# Patient Record
Sex: FEMALE | Race: OTHER | NOT HISPANIC OR LATINO | URBAN - METROPOLITAN AREA
[De-identification: names, ages, dates, MRNs, and addresses within clinical notes are randomized per-mention and may not be internally consistent; named-entity substitution may affect disease eponyms.]

---

## 2020-09-04 ENCOUNTER — INPATIENT (INPATIENT)
Facility: HOSPITAL | Age: 70
LOS: 0 days | Discharge: HOME | DRG: 149 | End: 2020-09-05
Attending: STUDENT IN AN ORGANIZED HEALTH CARE EDUCATION/TRAINING PROGRAM | Admitting: STUDENT IN AN ORGANIZED HEALTH CARE EDUCATION/TRAINING PROGRAM
Payer: MEDICARE

## 2020-09-04 VITALS
SYSTOLIC BLOOD PRESSURE: 169 MMHG | RESPIRATION RATE: 17 BRPM | HEIGHT: 60 IN | HEART RATE: 67 BPM | TEMPERATURE: 98 F | DIASTOLIC BLOOD PRESSURE: 95 MMHG | WEIGHT: 121.92 LBS | OXYGEN SATURATION: 98 %

## 2020-09-04 DIAGNOSIS — R11.2 NAUSEA WITH VOMITING, UNSPECIFIED: ICD-10-CM

## 2020-09-04 DIAGNOSIS — R42 DIZZINESS AND GIDDINESS: ICD-10-CM

## 2020-09-04 DIAGNOSIS — Z29.9 ENCOUNTER FOR PROPHYLACTIC MEASURES, UNSPECIFIED: ICD-10-CM

## 2020-09-04 DIAGNOSIS — E03.9 HYPOTHYROIDISM, UNSPECIFIED: ICD-10-CM

## 2020-09-04 LAB
APPEARANCE UR: CLEAR — SIGNIFICANT CHANGE UP
BASOPHILS # BLD AUTO: 0.04 K/UL — SIGNIFICANT CHANGE UP (ref 0–0.2)
BASOPHILS NFR BLD AUTO: 0.6 % — SIGNIFICANT CHANGE UP (ref 0–2)
BILIRUB UR-MCNC: NEGATIVE — SIGNIFICANT CHANGE UP
COLOR SPEC: COLORLESS — SIGNIFICANT CHANGE UP
DIFF PNL FLD: NEGATIVE — SIGNIFICANT CHANGE UP
EOSINOPHIL # BLD AUTO: 0.1 K/UL — SIGNIFICANT CHANGE UP (ref 0–0.5)
EOSINOPHIL NFR BLD AUTO: 1.5 % — SIGNIFICANT CHANGE UP (ref 0–6)
GLUCOSE UR QL: NEGATIVE — SIGNIFICANT CHANGE UP
HCT VFR BLD CALC: 38.6 % — SIGNIFICANT CHANGE UP (ref 34.5–45)
HGB BLD-MCNC: 12.3 G/DL — SIGNIFICANT CHANGE UP (ref 11.5–15.5)
IMM GRANULOCYTES NFR BLD AUTO: 0.3 % — SIGNIFICANT CHANGE UP (ref 0–1.5)
KETONES UR-MCNC: NEGATIVE — SIGNIFICANT CHANGE UP
LEUKOCYTE ESTERASE UR-ACNC: NEGATIVE — SIGNIFICANT CHANGE UP
LYMPHOCYTES # BLD AUTO: 1.49 K/UL — SIGNIFICANT CHANGE UP (ref 1–3.3)
LYMPHOCYTES # BLD AUTO: 22.2 % — SIGNIFICANT CHANGE UP (ref 13–44)
MAGNESIUM SERPL-MCNC: 2 MG/DL — SIGNIFICANT CHANGE UP (ref 1.6–2.6)
MCHC RBC-ENTMCNC: 26.2 PG — LOW (ref 27–34)
MCHC RBC-ENTMCNC: 31.9 GM/DL — LOW (ref 32–36)
MCV RBC AUTO: 82.3 FL — SIGNIFICANT CHANGE UP (ref 80–100)
MONOCYTES # BLD AUTO: 0.32 K/UL — SIGNIFICANT CHANGE UP (ref 0–0.9)
MONOCYTES NFR BLD AUTO: 4.8 % — SIGNIFICANT CHANGE UP (ref 2–14)
NEUTROPHILS # BLD AUTO: 4.74 K/UL — SIGNIFICANT CHANGE UP (ref 1.8–7.4)
NEUTROPHILS NFR BLD AUTO: 70.6 % — SIGNIFICANT CHANGE UP (ref 43–77)
NITRITE UR-MCNC: NEGATIVE — SIGNIFICANT CHANGE UP
NRBC # BLD: 0 /100 WBCS — SIGNIFICANT CHANGE UP (ref 0–0)
PH UR: 7 — SIGNIFICANT CHANGE UP (ref 5–8)
PHOSPHATE SERPL-MCNC: 3 MG/DL — SIGNIFICANT CHANGE UP (ref 2.5–4.5)
PLATELET # BLD AUTO: 328 K/UL — SIGNIFICANT CHANGE UP (ref 150–400)
PROT UR-MCNC: NEGATIVE — SIGNIFICANT CHANGE UP
RBC # BLD: 4.69 M/UL — SIGNIFICANT CHANGE UP (ref 3.8–5.2)
RBC # FLD: 13.7 % — SIGNIFICANT CHANGE UP (ref 10.3–14.5)
SARS-COV-2 RNA SPEC QL NAA+PROBE: SIGNIFICANT CHANGE UP
SP GR SPEC: 1.01 — LOW (ref 1.01–1.02)
TROPONIN T, HIGH SENSITIVITY RESULT: 7 NG/L — SIGNIFICANT CHANGE UP (ref 0–51)
TSH SERPL-MCNC: 0.84 UIU/ML — SIGNIFICANT CHANGE UP (ref 0.27–4.2)
UROBILINOGEN FLD QL: NEGATIVE — SIGNIFICANT CHANGE UP
WBC # BLD: 6.71 K/UL — SIGNIFICANT CHANGE UP (ref 3.8–10.5)
WBC # FLD AUTO: 6.71 K/UL — SIGNIFICANT CHANGE UP (ref 3.8–10.5)

## 2020-09-04 PROCEDURE — 99285 EMERGENCY DEPT VISIT HI MDM: CPT

## 2020-09-04 PROCEDURE — 70498 CT ANGIOGRAPHY NECK: CPT | Mod: 26

## 2020-09-04 PROCEDURE — 71045 X-RAY EXAM CHEST 1 VIEW: CPT | Mod: 26

## 2020-09-04 PROCEDURE — 93010 ELECTROCARDIOGRAM REPORT: CPT

## 2020-09-04 PROCEDURE — 99223 1ST HOSP IP/OBS HIGH 75: CPT

## 2020-09-04 PROCEDURE — 70496 CT ANGIOGRAPHY HEAD: CPT | Mod: 26

## 2020-09-04 RX ORDER — ONDANSETRON 8 MG/1
4 TABLET, FILM COATED ORAL ONCE
Refills: 0 | Status: COMPLETED | OUTPATIENT
Start: 2020-09-04 | End: 2020-09-04

## 2020-09-04 RX ORDER — INFLUENZA VIRUS VACCINE 15; 15; 15; 15 UG/.5ML; UG/.5ML; UG/.5ML; UG/.5ML
0.5 SUSPENSION INTRAMUSCULAR ONCE
Refills: 0 | Status: COMPLETED | OUTPATIENT
Start: 2020-09-04 | End: 2020-09-05

## 2020-09-04 RX ORDER — MECLIZINE HCL 12.5 MG
25 TABLET ORAL EVERY 8 HOURS
Refills: 0 | Status: DISCONTINUED | OUTPATIENT
Start: 2020-09-04 | End: 2020-09-05

## 2020-09-04 RX ORDER — ENOXAPARIN SODIUM 100 MG/ML
40 INJECTION SUBCUTANEOUS DAILY
Refills: 0 | Status: DISCONTINUED | OUTPATIENT
Start: 2020-09-04 | End: 2020-09-05

## 2020-09-04 RX ORDER — SODIUM CHLORIDE 9 MG/ML
500 INJECTION INTRAMUSCULAR; INTRAVENOUS; SUBCUTANEOUS ONCE
Refills: 0 | Status: COMPLETED | OUTPATIENT
Start: 2020-09-04 | End: 2020-09-04

## 2020-09-04 RX ORDER — ACETAMINOPHEN 500 MG
325 TABLET ORAL ONCE
Refills: 0 | Status: COMPLETED | OUTPATIENT
Start: 2020-09-04 | End: 2020-09-04

## 2020-09-04 RX ORDER — ONDANSETRON 8 MG/1
4 TABLET, FILM COATED ORAL EVERY 12 HOURS
Refills: 0 | Status: DISCONTINUED | OUTPATIENT
Start: 2020-09-04 | End: 2020-09-05

## 2020-09-04 RX ORDER — LEVOTHYROXINE SODIUM 125 MCG
125 TABLET ORAL DAILY
Refills: 0 | Status: DISCONTINUED | OUTPATIENT
Start: 2020-09-04 | End: 2020-09-05

## 2020-09-04 RX ORDER — MECLIZINE HCL 12.5 MG
25 TABLET ORAL ONCE
Refills: 0 | Status: COMPLETED | OUTPATIENT
Start: 2020-09-04 | End: 2020-09-04

## 2020-09-04 RX ORDER — ACETAMINOPHEN 500 MG
650 TABLET ORAL ONCE
Refills: 0 | Status: COMPLETED | OUTPATIENT
Start: 2020-09-04 | End: 2020-09-04

## 2020-09-04 RX ORDER — LEVOTHYROXINE SODIUM 125 MCG
1 TABLET ORAL
Qty: 0 | Refills: 0 | DISCHARGE

## 2020-09-04 RX ADMIN — Medication 25 MILLIGRAM(S): at 11:55

## 2020-09-04 RX ADMIN — SODIUM CHLORIDE 500 MILLILITER(S): 9 INJECTION INTRAMUSCULAR; INTRAVENOUS; SUBCUTANEOUS at 12:41

## 2020-09-04 RX ADMIN — SODIUM CHLORIDE 500 MILLILITER(S): 9 INJECTION INTRAMUSCULAR; INTRAVENOUS; SUBCUTANEOUS at 11:40

## 2020-09-04 RX ADMIN — Medication 325 MILLIGRAM(S): at 13:45

## 2020-09-04 RX ADMIN — SODIUM CHLORIDE 500 MILLILITER(S): 9 INJECTION INTRAMUSCULAR; INTRAVENOUS; SUBCUTANEOUS at 13:10

## 2020-09-04 RX ADMIN — Medication 325 MILLIGRAM(S): at 14:38

## 2020-09-04 RX ADMIN — ONDANSETRON 4 MILLIGRAM(S): 8 TABLET, FILM COATED ORAL at 11:40

## 2020-09-04 RX ADMIN — Medication 650 MILLIGRAM(S): at 11:55

## 2020-09-04 RX ADMIN — SODIUM CHLORIDE 500 MILLILITER(S): 9 INJECTION INTRAMUSCULAR; INTRAVENOUS; SUBCUTANEOUS at 12:40

## 2020-09-04 RX ADMIN — Medication 650 MILLIGRAM(S): at 13:30

## 2020-09-04 NOTE — ED ADULT NURSE NOTE - OBJECTIVE STATEMENT
70 year old female, a+ox, presents to ED complaining of dizziness x "a couple of days." pt states that the room is spinning around and not lightheaded. pt takes notice of the spinning when she moves quickly.  +vomited several times prior to coming in to the ED.  Small sips of gatorade was done, but pt was unable to tolerate PO intake.  States that the nausea and spinning occurred at the same time.  No chest pains/sob/palpitations/ 70 year old female, a+ox, presents to ED complaining of dizziness x "a couple of days." pt states that the room is spinning around and not lightheaded. pt takes notice of the spinning when she moves quickly.  +vomited several times prior to coming in to the ED.  Small sips of gatorade was done, but pt was unable to tolerate PO intake.  States that the nausea and spinning occurred at the same time.  No chest pains/sob/palpitations/swelling in feet/fevers/chills/cough. Abd soft nontender, nondistended. lungs cta unlabored. No visual changes, PERRL 3mm bilaterally. no numbness/tingling to extremities.  skin w/d/i

## 2020-09-04 NOTE — ED ADULT NURSE REASSESSMENT NOTE - NS ED NURSE REASSESS COMMENT FT1
Patient's daughter Tesfaye would like to be called at [phone] in case of emergency or if info is needed.

## 2020-09-04 NOTE — ED PROVIDER NOTE - ATTENDING CONTRIBUTION TO CARE
I have personally performed a face to face medical and diagnostic evaluation of the patient. I have discussed with and reviewed the Resident's note and agree with the History, ROS, Physical Exam and MDM unless otherwise indicated. A brief summary of my personal evaluation and impression can be found below.    70F hx hypothyroidism presents with a cc of lightheadedness and dizziness a/w mild b/l frontal HA since this a.m reports shortly after lifting grandson, felt dizzy, no syncope, thereafter had x4 episodes of NBNB nausea and vomiting, no abdominal pain, no fevers, dizziness feels as if room spinning, improved while at rest, worse when sitting and standing, no exertional sx, no chest pain no SOB no HARDIN. NO LE swelling. Denies numbness, tingling or loss of sensation. Denies loss of motor function. NO falls no trauma no LOC. Had similar episode x1 week ago, which resolved. Denies f/c/cp/sob. Denies headache, syncope, Denies chest palpitations, abdominal pain. Denies edema. Denies dysuria, hematuria, BRBPR, tarry stools, diarrhea, constipation.     All other ROS negative, except as above and as per HPI and ROS section.    VITALS: Initial triage and subsequent vitals have been reviewed by me.  GEN APPEARANCE: WDWN, alert, non-toxic, NAD  HEAD: Atraumatic.  EYES: PERRLa, EOMI, vision grossly intact.   NECK: Supple  CV: RRR, S1S2, no c/r/m/g. Cap refill <2 seconds. No bruits.   LUNGS: CTAB. No abnormal breath sounds.  ABDOMEN: Soft, NTND. No guarding or rebound.   MSK/EXT: No spinal or extremity point tenderness. No CVA ttp. Pelvis stable. No peripheral edema.  NEURO: Alert, follows commands. Weight bearing normal. Speech normal. Sensation and motor normal x4 extremities. CN2-12 normal, coordination normal, ambulating normally.  SKIN: Warm, dry and intact. No rash.  PSYCH: Appropriate    Plan/MDM: 70F hypothyroid presents with a cc of dizziness a/w nausea / vomiting after lifting son today, exam non focal, no murmur, ddx consider peripheral vertigo vs dehydration vs e-lyte abnormality, less likely central vertigo given normal exam, sx worse with movement, low c/f cardiogenic origin given no murmur, ekg non ischemic. Will check labs, ua cxr eval for infxn, give meds, hydrate, orthostatics, reassess, consider imaging further workup if not improved.

## 2020-09-04 NOTE — H&P ADULT - PROBLEM SELECTOR PLAN 1
-acute onset but self limited episode of dizziness  -VSS here, orthostatic negative. Labs unrevealing.   -no infectious sx: afeb, no leukocytosis, CXR clear  -CTH reviewed, no acute changes. 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 d/w patient, to get serial imaging as outpatient  -Neuro recs appreciated; no low suspicion for acute cva or central cause of vertigo, possible peripheral vertigo  -good response to meclizine, c/w prn meclizine for now  -fall prec  -TSH wnl  -check TTE  -HST delta neg. EKG nonischemic.  consider nonemergent cards eval in am.

## 2020-09-04 NOTE — H&P ADULT - NSHPREVIEWOFSYSTEMS_GEN_ALL_CORE
REVIEW OF SYSTEMS:  CONSTITUTIONAL: No weakness. No fevers. No chills. No weight loss. Good appetite.  EYES: No visual changes. No eye pain.  ENT: No hearing difficulty. No vertigo. No dysphagia. No Sinusitis/rhinorrhea.  NECK: No pain. No stiffness/rigidity.  CARDIAC: No chest pain. No palpitations.  RESPIRATORY: No cough. No SOB. No hemoptysis.  GASTROINTESTINAL: No abdominal pain. No nausea. No vomiting. No hematemesis. No diarrhea. No constipation. No melena. No hematochezia.  GENITOURINARY: No dysuria. No frequency. No hesitancy. No hematuria.  NEUROLOGICAL: No numbness. No focal weakness. No incontinence. No headache.  BACK: No back pain.  EXTREMITIES: No lower extremity edema. Full ROM.  SKIN: No rashes. No itching. No other lesions.  PSYCHIATRIC: No depression. No anxiety. No SI/HI.  ALLERGIC: No lip swelling. No hives.  All other review of systems is negative unless indicated above.  [  ] Unable to assess ROS because REVIEW OF SYSTEMS:  CONSTITUTIONAL: No weakness. No fevers. No chills. No weight loss. Good appetite.  EYES: No visual changes. No eye pain.  ENT: No hearing difficulty. resolved vertigo. No dysphagia. No Sinusitis/rhinorrhea.  NECK: No pain. No stiffness/rigidity.  CARDIAC: No chest pain. No palpitations.  RESPIRATORY: No cough. No SOB. No hemoptysis.  GASTROINTESTINAL: No abdominal pain. No nausea. No vomiting. No hematemesis. No diarrhea. No constipation. No melena. No hematochezia.  GENITOURINARY: No dysuria. No frequency. No hesitancy. No hematuria.  NEUROLOGICAL: No numbness. No focal weakness. No incontinence. No headache.  BACK: No back pain.  EXTREMITIES: No lower extremity edema. Full ROM.  SKIN: No rashes. No itching. No other lesions.  PSYCHIATRIC: No depression. No anxiety. No SI/HI.  ALLERGIC: No lip swelling. No hives.  All other review of systems is negative unless indicated above.

## 2020-09-04 NOTE — ED ADULT NURSE NOTE - ED STAT RN HANDOFF DETAILS
Report given to LAURA Peralta after shift change after LAURA Rice attempted to call to give report on multiple occasions.

## 2020-09-04 NOTE — H&P ADULT - HISTORY OF PRESENT ILLNESS
70 F PMH hypothyroidism, p/w dizziness and vomiting.  Onset 1 day ago, was feeling fine the night before, states she ate well. She showered this am and daughter brought her grandkids over for her to watch, pt soon after developed room spinning dizziness. Son gave her gatorade, after which pt endorsed feeling better. Pt also tried to eat something salty, after which she vomited, nbnb.  Pt also does endorse that if she gets up from lying down quickly she feels dizzy.  She denies cp, sob, santa, f/c, diarrhea, abd pain, back pain, visual changes, HA, neck pain or stiffness, incontinence, tearing abd or back pain. Denies new meds.     VS: 98.3, 67, 169/95, 17, 98% RA.  Orthostatic negative in ER. Labs: cbc unrevealing, HST delta 10 -->7, TSH wnl, cmp unrevealing, mg/phos wnl, UA neg.  CXR prelim clear lungs. CTH no acute findings. CTA h/n: no occclusions or stenosis, +incidental R MCA bifurcation 3 mm aneurysm. IN ER pt received ivf, tylenol, zofran, and meclizine; pt endorses feeling better after meclizine.

## 2020-09-04 NOTE — H&P ADULT - NSHPSOCIALHISTORY_GEN_ALL_CORE
Social History:    Marital Status:  (   )    (   ) Single    (   )    (  )   Occupation:   Lives with: (  ) alone  (  ) children   (  ) spouse   (  ) parents  (  ) other    Substance Use (street drugs): (  ) never used  (  ) other:  Tobacco Usage:  (   ) never smoked   (   ) former smoker   (   ) current smoker  (     ) pack year  (        ) last cigarette date  Alcohol Usage:  Sexual History: Social History:      Occupation: not working  Lives with: (x  ) alone  (  ) children   (  ) spouse   (  ) parents  (  ) other    Substance Use (street drugs): ( x ) never used  (  ) other:  Tobacco Usage:  (   x) never smoked   (   ) former smoker   (   ) current smoker  (     ) pack year  (        ) last cigarette date  Alcohol Usage: denies

## 2020-09-04 NOTE — PATIENT PROFILE ADULT - NSPROEXTENSIONSOFSELF_GEN_A_NUR
none Principal Discharge DX:	Acute dyspnea  Secondary Diagnosis:	Abnormal findings on cardiac catheterization  Secondary Diagnosis:	DM2 (diabetes mellitus, type 2)

## 2020-09-04 NOTE — ED PROVIDER NOTE - NS ED ROS FT
General: denies fever, chills  HENT: denies nasal congestion, rhinorrhea  Eyes: denies visual changes, blurred vision  CV: denies chest pain, palpitations  Resp: denies difficulty breathing, cough  Abdominal: positive nausea & vomiting, no diarrhea or abdominal pain  MSK: denies muscle aches, leg swelling  Neuro: positive headaches, no numbness or tingling  Skin: denies rashes, bruises

## 2020-09-04 NOTE — H&P ADULT - ATTENDING COMMENTS
Care to be assumed by [facility] hospitalist at 8 am.  This patient was assigned to me by the hospitalist in charge; my involvement in this case has consisted of the initial history, physical, chart review, and management plan.  This patient was previously unknown to me.

## 2020-09-04 NOTE — ED PROVIDER NOTE - OBJECTIVE STATEMENT
Patient is a 71 yo F w/ no significant PMH BIEMS for vomiting. Patient states she had 3-4 episodes of NBNB emesis that was brought on after the patient stood up and felt the room spinning. Patient notes a similar episode last week that was associated with 1 episode of emesis and self resolved. Patient notes a headache at this time, but denies any changes in vision, hearing loss, tinnitus, trauma/falls, LOC, chest pain, SOB, abdominal pain, pain w/ urination or changes in her BM.

## 2020-09-04 NOTE — CONSULT NOTE ADULT - CONSULT REQUESTED DATE/TIME
LVM to notify pt Mirena is covered with $0.00 copay on pharmacy benefit. Need to offer pharmacist consultation and obtain consent to deliver to [facility]. Will follow up if no return call is received.     04-Sep-2020 16:11

## 2020-09-04 NOTE — ED PROVIDER NOTE - PROGRESS NOTE DETAILS
MD Demetris: Patient reassessed at bedside and notes persistent headache that is worse when standing up. Will provide additional tylenol prior to CT visit. MD Demetris: Discussed blood results and CT scan results w/ patient and patient's daughter. Discussed the 3mm aneurysm found on the RCA, unclear of chronicity of it. Also discussed the abnormal EKG that cannot be explained by electrolyte abnormality as patient's blood work does not indicate any electrolyte abnormality. Performed a walking test with patient and patient continues to complain of dizziness and mild headache, although improved from this morning and a right sided gait is noted. Patient will likely require medicine admission, neurology consult and cardiology consult. Patient and patient's daughter understands and agrees with plan. MD Demetris: Patient reassessed at bedside after neurology team evaluated patient. Per neurology, low suspicion for central pathology or stroke. Patient continues to feel symptomatic when she walks; plan will be to admit for cardiac workup secondary to abnormal EKG.

## 2020-09-04 NOTE — ED PROVIDER NOTE - PHYSICAL EXAMINATION
GENERAL: well appearing in no acute distress, non-toxic appearing  HEAD: normocephalic, atraumatic  HENT: oral mucosa moist, neck supple, negative Renetta-hallpike   EYE: Normal conjunctiva, PERRLA, EOMI; negative nystagmus  CARDIAC: regular rate and rhythm, normal S1S2, no appreciable murmurs, 2+ pulses in UE/LE b/l  PULM: normal breath sounds, clear to ascultation bilaterally, no rales, rhonchi, wheezing  GI: abdomen nondistended, soft, nontender, no guarding, rebound tenderness  : no CVA tenderness b/l, no suprapubic tenderness  NEURO: no focal motor or sensory deficits, CN2-12 intact, normal speech  MSK: no peripheral edema, no calf tenderness b/l  SKIN: well-perfused, extremities warm, no visible rashes

## 2020-09-04 NOTE — ED ADULT NURSE REASSESSMENT NOTE - NS ED NURSE REASSESS COMMENT FT1
waiting on medicine bed. rtm clicked. no acute distress. will cont to monitor. vss. daughter at bedside

## 2020-09-04 NOTE — CONSULT NOTE ADULT - SUBJECTIVE AND OBJECTIVE BOX
HPI:  Patient is a 71yo F with pmhx of hypothyroidism who presented to ED for complaint of dizziness and vomiting. Patient reports gradual onset of dizziness at 0630 today. She describes it as a room-spinning sensation with associated nausea. She reports that the dizziness initially subsided while she had her morning tea. She then took a shower and again felt the onset of dizziness. She reports needing to lay down and then having an episode of vomiting. Patient was initially taken to urgent care but then daughter chose to bring her to the hospital. Patient states she did not eat anything else this AM and missed her AM dose of synthroid due to the nausea and vomiting. Patient states that she had a similar episode of dizziness 1 week ago with associated nausea and vomiting. That episode lasted 2-3hrs. Patient at that time hydrated with gatorade and felt better. At the time of evaluation patient reports resolution of all symptoms. Denies headache, dizziness, nausea, blurry/double vision, tinnitus, cp, sob, n/t. Patient endorses mild generalized weakness.     ROS: A 10-system ROS was performed and is negative except for those items noted above and/or in the HPI.    PAST MEDICAL & SURGICAL HISTORY:  Hypothyroid  No significant past surgical history    FAMILY HISTORY:      SOCIAL HISTORY: SOCIAL HISTORY:     Marital Status: (  )   (  ) Single  (  )   (  )      Occupation:      Lives: (  ) alone  (  ) with children   (  ) with spouse  (  ) with parents  (  ) other     Illicit Drug Use: (  ) never used  (  ) other _____     Tobacco Use:  (  ) never smoked  (  ) former smoker  (  ) current smoker  (  ) pack year  (  ) last cigarette date     Alcohol Use:      Sexual History:        MEDICATIONS  Home Medications:  levothyroxine 125 mcg (0.125 mg) oral tablet: 1 tab(s) orally once a day (04 Sep 2020 14:21)      MEDICATIONS  (STANDING):    MEDICATIONS  (PRN):      ALLERGIES/INTOLERANCES:  Allergies  No Known Allergies    Intolerances      OBJECTIVE:  VITALS   Vital Signs Last 24 Hrs  T(C): 36.8 (04 Sep 2020 10:55), Max: 36.8 (04 Sep 2020 10:55)  T(F): 98.3 (04 Sep 2020 10:55), Max: 98.3 (04 Sep 2020 10:55)  HR: 57 (04 Sep 2020 13:22) (57 - 67)  BP: 163/80 (04 Sep 2020 13:22) (163/80 - 169/95)  BP(mean): --  RR: 16 (04 Sep 2020 13:28) (16 - 17)  SpO2: 100% (04 Sep 2020 13:28) (98% - 100%)    PHYSICAL EXAM:    General:  Constitutional: Obese Female, appears stated age, in no apparent distress including pain  Head: Normocephalic & atraumatic.    Neurological (>12):  MS: Awake, alert, oriented to person, place, situation, time. Normal affect. Follows all commands.    Language: Speech is clear, fluent with good repetition & comprehension (able to name objects: pen, watch, thumb)    CNs: PERRLA (R = 3mm, L = 3mm). VFF. EOMI no nystagmus, no diplopia. V1-3 intact to LT/pinprick, well developed masseter muscles b/l. No facial asymmetry b/l, full eye closure strength b/l. Hearing grossly normal (rubbing fingers) b/l. Symmetric palate elevation in midline. Gag reflex deferred. Head turning & shoulder shrug intact b/l. Tongue midline, normal movements, no atrophy.    Fundoscopic: pale w/ sharp discs margins No vascular changes.      Motor: Normal muscle bulk & tone. No noticeable tremor or seizure. No pronator drift.              Deltoid	Biceps	Triceps	   R	5	5	5	5		  L	5	5	5	5	    	H-Flex	K-Flex	K-Ext	D-Flex	P-Flex  R	5	5	5	5	5		   L	5	5	5	5	5		     Sensation: Intact to LT b/l throughout.     Cortical: Extinction on DSS (neglect): none    Reflexes:              Biceps(C5)       BR(C6)     Triceps(C7)               Patellar(L4)    Achilles(S1)    Plantar Resp  R	2	          2	             2		        2		    2		Down   L	2	          2	             2		        2		    2		Down     Coordination: intact rapid-alt movements. No dysmetria to FTN    Gait: Normal Romberg. No postural instability. Normal stance and tandem gait.     LABORATORY:  CBC                       12.3   6.71  )-----------( 328      ( 04 Sep 2020 11:54 )             38.6     Chem 09-04    139  |  103  |  11  ----------------------------<  116<H>  3.7   |  26  |  0.63    Ca    9.0      04 Sep 2020 11:54  Phos  3.0     09-04  Mg     2.0     09-04    TPro  7.1  /  Alb  4.3  /  TBili  0.3  /  DBili  x   /  AST  22  /  ALT  20  /  AlkPhos  88  09-04    LFTs LIVER FUNCTIONS - ( 04 Sep 2020 11:54 )  Alb: 4.3 g/dL / Pro: 7.1 g/dL / ALK PHOS: 88 U/L / ALT: 20 U/L / AST: 22 U/L / GGT: x           Coagulopathy   Lipid Panel   A1c   Cardiac enzymes     U/A Urinalysis Basic - ( 04 Sep 2020 13:07 )    Color: Colorless / Appearance: Clear / S.007 / pH: x  Gluc: x / Ketone: Negative  / Bili: Negative / Urobili: Negative   Blood: x / Protein: Negative / Nitrite: Negative   Leuk Esterase: Negative / RBC: x / WBC x   Sq Epi: x / Non Sq Epi: x / Bacteria: x

## 2020-09-04 NOTE — H&P ADULT - NSHPLABSRESULTS_GEN_ALL_CORE
Labs personally reviewed : cbc unrevealing, HST delta 10 -->7, TSH wnl, cmp unrevealing, mg/phos wnl, UA neg.   Imaging personally reviewed  CXR prelim clear lungs. CTH no acute findings. CTA h/n: no occclusions or stenosis, +incidental R MCA bifurcation 3 mm aneurysm.  EKG personally reviewed

## 2020-09-04 NOTE — CONSULT NOTE ADULT - ASSESSMENT
Patient is a 71yo F with pmhx of hypothyroidism who presented to ED for complaint of dizziness and vomiting. Episode was self-limited and patient reports resolution of all symptoms on evaluation. Neurological exam is normal and non-focal. CTH was negative for any acute pathology. CTA H/N showed and incidental 3mm aneurysm of R MCA bifurcation. ED staff performed kyra-hallpike prior to evaluation. Dizziness possibly peripheral vertigo improved with kyra hallpike maneuver vs 2/2 dehydration. Labs unremarkable     Recommendations  - Meclizine short term for dizziness, reglan for nausea   - Referral for vestibular therapy for continued episodes of peripheral vertigo  - No further neuroimaging recommended at this time   - Neurology follow up outpatient: [address] [place] [address] [phone]    Case discussed with neurology attending Dr. Bedoya Patient is a 69yo F with pmhx of hypothyroidism who presented to ED for complaint of dizziness and vomiting. Episode was self-limited and patient reports resolution of all symptoms on evaluation. Neurological exam is normal and non-focal. CTH was negative for any acute pathology. CTA H/N showed and incidental 3mm aneurysm of R MCA bifurcation. ED staff performed kyra-hallpike prior to evaluation. Dizziness possibly peripheral vertigo improved with kyra hallpike maneuver vs 2/2 dehydration. Low suspicion for central pathology or stroke. Labs unremarkable     Recommendations  - Meclizine short term for dizziness, reglan for nausea   - Referral for vestibular therapy for continued episodes of peripheral vertigo  - No further neuroimaging recommended at this time   - Neurology follow up outpatient: [address] [place] [address] [phone]    Case discussed with neurology attending Dr. Bedoya

## 2020-09-04 NOTE — ED ADULT NURSE NOTE - NS ED NURSE REPORT GIVEN TO FT
attempted to give report at 19:15 . [facility] unit rep stated that the RNs are in the middle of report and cant take report at this time. explained to her that pt is leaving department and the change of shift rn will be unable to give report because he never had contact with pt. Juan RN in ED made aware and acknowledged

## 2020-09-04 NOTE — H&P ADULT - NSHPPHYSICALEXAM_GEN_ALL_CORE
PHYSICAL EXAM:  Vital Signs Last 24 Hrs  T(C): 36.8 (04 Sep 2020 20:55), Max: 37.1 (04 Sep 2020 20:05)  T(F): 98.2 (04 Sep 2020 20:55), Max: 98.7 (04 Sep 2020 20:05)  HR: 53 (04 Sep 2020 20:55) (53 - 71)  BP: 170/79 (04 Sep 2020 20:55) (162/72 - 170/79)  BP(mean): --  RR: 18 (04 Sep 2020 20:55) (16 - 18)  SpO2: 96% (04 Sep 2020 20:55) (96% - 100%)  GENERAL: NAD, well-groomed, well-developed  HEAD:  Atraumatic, Normocephalic  EYES: EOMI, PERRLA, conjunctiva and sclera clear  ENMT: No tonsillar erythema, exudates, or enlargement; Moist mucous membranes, Good dentition, No lesions  NECK: Supple, No JVD, Normal thyroid  CHEST/LUNG: Clear to percussion bilaterally; No rales, rhonchi, wheezing, or rubs  HEART: Regular rate and rhythm; No murmurs, rubs, or gallops  ABDOMEN: Soft, Nontender, Nondistended; Bowel sounds present  EXTREMITIES:  2+ Peripheral Pulses, No clubbing, cyanosis, or edema  LYMPH: No lymphadenopathy noted  SKIN: No rashes or lesions  NERVOUS SYSTEM:  Alert & Oriented X3, Good concentration; Motor Strength 5/5 B/L upper and lower extremities; DTRs 2+ intact and symmetric PHYSICAL EXAM:  Vital Signs Last 24 Hrs  T(C): 36.8 (04 Sep 2020 20:55), Max: 37.1 (04 Sep 2020 20:05)  T(F): 98.2 (04 Sep 2020 20:55), Max: 98.7 (04 Sep 2020 20:05)  HR: 53 (04 Sep 2020 20:55) (53 - 71)  BP: 170/79 (04 Sep 2020 20:55) (162/72 - 170/79)  BP(mean): --  RR: 18 (04 Sep 2020 20:55) (16 - 18)  SpO2: 96% (04 Sep 2020 20:55) (96% - 100%)  GENERAL: NAD, well-groomed, well-developed  HEAD:  Atraumatic, Normocephalic  EYES: EOMI, PERRLA, conjunctiva and sclera clear  ENMT: No tonsillar erythema, exudates, or enlargement; Moist mucous membranes, Good dentition, No lesions  NECK: Supple, No JVD, Normal thyroid  CHEST/LUNG: Clear to percussion bilaterally; No rales, rhonchi, wheezing, or rubs no resp distress or acc muscle usage.  HEART: Regular rate and rhythm; No murmurs, rubs, or gallops, no sig Le edema  ABDOMEN: Soft, Nontender, Nondistended; Bowel sounds present, no rebound/guarding  EXTREMITIES:  2+ Peripheral Pulses, No clubbing, cyanosis, or edema  LYMPH: No lymphadenopathy noted, no lymphangitis  SKIN: No rashes or lesions, no petechiae  NERVOUS SYSTEM:  Alert & Oriented X3, Good concentration; Motor Strength 5/5 B/L upper and lower extremities. no facial droop tongue midline. no dysarthria  PSYCH: normal affect no si no hi

## 2020-09-05 ENCOUNTER — TRANSCRIPTION ENCOUNTER (OUTPATIENT)
Age: 70
End: 2020-09-05

## 2020-09-05 VITALS
HEART RATE: 61 BPM | TEMPERATURE: 98 F | SYSTOLIC BLOOD PRESSURE: 145 MMHG | RESPIRATION RATE: 18 BRPM | DIASTOLIC BLOOD PRESSURE: 75 MMHG | OXYGEN SATURATION: 99 %

## 2020-09-05 LAB
ALBUMIN SERPL ELPH-MCNC: 4.2 G/DL — SIGNIFICANT CHANGE UP (ref 3.3–5)
ALP SERPL-CCNC: 82 U/L — SIGNIFICANT CHANGE UP (ref 40–120)
ALT FLD-CCNC: 18 U/L — SIGNIFICANT CHANGE UP (ref 10–45)
ANION GAP SERPL CALC-SCNC: 10 MMOL/L — SIGNIFICANT CHANGE UP (ref 5–17)
AST SERPL-CCNC: 21 U/L — SIGNIFICANT CHANGE UP (ref 10–40)
BASOPHILS # BLD AUTO: 0.04 K/UL — SIGNIFICANT CHANGE UP (ref 0–0.2)
BASOPHILS NFR BLD AUTO: 0.6 % — SIGNIFICANT CHANGE UP (ref 0–2)
BILIRUB SERPL-MCNC: 0.4 MG/DL — SIGNIFICANT CHANGE UP (ref 0.2–1.2)
BUN SERPL-MCNC: 12 MG/DL — SIGNIFICANT CHANGE UP (ref 7–23)
CALCIUM SERPL-MCNC: 9.2 MG/DL — SIGNIFICANT CHANGE UP (ref 8.4–10.5)
CHLORIDE SERPL-SCNC: 106 MMOL/L — SIGNIFICANT CHANGE UP (ref 96–108)
CO2 SERPL-SCNC: 25 MMOL/L — SIGNIFICANT CHANGE UP (ref 22–31)
CREAT SERPL-MCNC: 0.74 MG/DL — SIGNIFICANT CHANGE UP (ref 0.5–1.3)
CULTURE RESULTS: SIGNIFICANT CHANGE UP
EOSINOPHIL # BLD AUTO: 0.26 K/UL — SIGNIFICANT CHANGE UP (ref 0–0.5)
EOSINOPHIL NFR BLD AUTO: 3.9 % — SIGNIFICANT CHANGE UP (ref 0–6)
GLUCOSE SERPL-MCNC: 98 MG/DL — SIGNIFICANT CHANGE UP (ref 70–99)
HCT VFR BLD CALC: 40.6 % — SIGNIFICANT CHANGE UP (ref 34.5–45)
HCV AB S/CO SERPL IA: 0.23 S/CO — SIGNIFICANT CHANGE UP (ref 0–0.99)
HCV AB SERPL-IMP: SIGNIFICANT CHANGE UP
HGB BLD-MCNC: 12.8 G/DL — SIGNIFICANT CHANGE UP (ref 11.5–15.5)
IMM GRANULOCYTES NFR BLD AUTO: 0.3 % — SIGNIFICANT CHANGE UP (ref 0–1.5)
LYMPHOCYTES # BLD AUTO: 2.42 K/UL — SIGNIFICANT CHANGE UP (ref 1–3.3)
LYMPHOCYTES # BLD AUTO: 36.3 % — SIGNIFICANT CHANGE UP (ref 13–44)
MAGNESIUM SERPL-MCNC: 2.2 MG/DL — SIGNIFICANT CHANGE UP (ref 1.6–2.6)
MCHC RBC-ENTMCNC: 25.8 PG — LOW (ref 27–34)
MCHC RBC-ENTMCNC: 31.5 GM/DL — LOW (ref 32–36)
MCV RBC AUTO: 81.9 FL — SIGNIFICANT CHANGE UP (ref 80–100)
MONOCYTES # BLD AUTO: 0.5 K/UL — SIGNIFICANT CHANGE UP (ref 0–0.9)
MONOCYTES NFR BLD AUTO: 7.5 % — SIGNIFICANT CHANGE UP (ref 2–14)
NEUTROPHILS # BLD AUTO: 3.43 K/UL — SIGNIFICANT CHANGE UP (ref 1.8–7.4)
NEUTROPHILS NFR BLD AUTO: 51.4 % — SIGNIFICANT CHANGE UP (ref 43–77)
NRBC # BLD: 0 /100 WBCS — SIGNIFICANT CHANGE UP (ref 0–0)
PHOSPHATE SERPL-MCNC: 3.2 MG/DL — SIGNIFICANT CHANGE UP (ref 2.5–4.5)
PLATELET # BLD AUTO: 332 K/UL — SIGNIFICANT CHANGE UP (ref 150–400)
POTASSIUM SERPL-MCNC: 3.4 MMOL/L — LOW (ref 3.5–5.3)
POTASSIUM SERPL-SCNC: 3.4 MMOL/L — LOW (ref 3.5–5.3)
PROT SERPL-MCNC: 7 G/DL — SIGNIFICANT CHANGE UP (ref 6–8.3)
RBC # BLD: 4.96 M/UL — SIGNIFICANT CHANGE UP (ref 3.8–5.2)
RBC # FLD: 13.7 % — SIGNIFICANT CHANGE UP (ref 10.3–14.5)
SODIUM SERPL-SCNC: 141 MMOL/L — SIGNIFICANT CHANGE UP (ref 135–145)
SPECIMEN SOURCE: SIGNIFICANT CHANGE UP
WBC # BLD: 6.67 K/UL — SIGNIFICANT CHANGE UP (ref 3.8–10.5)
WBC # FLD AUTO: 6.67 K/UL — SIGNIFICANT CHANGE UP (ref 3.8–10.5)

## 2020-09-05 PROCEDURE — U0003: CPT

## 2020-09-05 PROCEDURE — 99221 1ST HOSP IP/OBS SF/LOW 40: CPT | Mod: GC

## 2020-09-05 PROCEDURE — 90686 IIV4 VACC NO PRSV 0.5 ML IM: CPT

## 2020-09-05 PROCEDURE — 97162 PT EVAL MOD COMPLEX 30 MIN: CPT

## 2020-09-05 PROCEDURE — 70450 CT HEAD/BRAIN W/O DYE: CPT

## 2020-09-05 PROCEDURE — 83735 ASSAY OF MAGNESIUM: CPT

## 2020-09-05 PROCEDURE — 84443 ASSAY THYROID STIM HORMONE: CPT

## 2020-09-05 PROCEDURE — 70496 CT ANGIOGRAPHY HEAD: CPT

## 2020-09-05 PROCEDURE — 80053 COMPREHEN METABOLIC PANEL: CPT

## 2020-09-05 PROCEDURE — 84100 ASSAY OF PHOSPHORUS: CPT

## 2020-09-05 PROCEDURE — 70498 CT ANGIOGRAPHY NECK: CPT

## 2020-09-05 PROCEDURE — 85027 COMPLETE CBC AUTOMATED: CPT

## 2020-09-05 PROCEDURE — 86803 HEPATITIS C AB TEST: CPT

## 2020-09-05 PROCEDURE — 81003 URINALYSIS AUTO W/O SCOPE: CPT

## 2020-09-05 PROCEDURE — 84484 ASSAY OF TROPONIN QUANT: CPT

## 2020-09-05 PROCEDURE — 71045 X-RAY EXAM CHEST 1 VIEW: CPT

## 2020-09-05 PROCEDURE — 87086 URINE CULTURE/COLONY COUNT: CPT

## 2020-09-05 RX ORDER — AMLODIPINE BESYLATE 2.5 MG/1
1 TABLET ORAL
Qty: 30 | Refills: 0
Start: 2020-09-05 | End: 2020-10-04

## 2020-09-05 RX ORDER — MECLIZINE HCL 12.5 MG
1 TABLET ORAL
Qty: 42 | Refills: 0
Start: 2020-09-05 | End: 2020-09-18

## 2020-09-05 RX ORDER — AMLODIPINE BESYLATE 2.5 MG/1
5 TABLET ORAL DAILY
Refills: 0 | Status: DISCONTINUED | OUTPATIENT
Start: 2020-09-05 | End: 2020-09-05

## 2020-09-05 RX ORDER — POTASSIUM CHLORIDE 20 MEQ
40 PACKET (EA) ORAL ONCE
Refills: 0 | Status: COMPLETED | OUTPATIENT
Start: 2020-09-05 | End: 2020-09-05

## 2020-09-05 RX ADMIN — AMLODIPINE BESYLATE 5 MILLIGRAM(S): 2.5 TABLET ORAL at 11:56

## 2020-09-05 RX ADMIN — Medication 40 MILLIEQUIVALENT(S): at 10:21

## 2020-09-05 RX ADMIN — INFLUENZA VIRUS VACCINE 0.5 MILLILITER(S): 15; 15; 15; 15 SUSPENSION INTRAMUSCULAR at 13:38

## 2020-09-05 RX ADMIN — ENOXAPARIN SODIUM 40 MILLIGRAM(S): 100 INJECTION SUBCUTANEOUS at 11:56

## 2020-09-05 RX ADMIN — Medication 125 MICROGRAM(S): at 06:23

## 2020-09-05 NOTE — DISCHARGE NOTE PROVIDER - NSDCCPCAREPLAN_GEN_ALL_CORE_FT
PRINCIPAL DISCHARGE DIAGNOSIS  Diagnosis: Dizziness  Assessment and Plan of Treatment: Orthostatic negative. Labs unrevealing, no infectious signs   CXR clear, CTH showed no acute changes. 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  s/p neurology evaluation. low suspicion for acute cva or central cause of vertigo, possible peripheral vertigo, good response to meclizine,   Continue with meclizine as needed  You can follow up with Neurology outpatient: [address] [place] [address] [phone]  Please follow up with your primray care physician in one week        SECONDARY DISCHARGE DIAGNOSES  Diagnosis: Hypertension  Assessment and Plan of Treatment: Noted Elevated BP  Please continue Norvasc as directed  Low salt diet recommended  Please follow up with your primary care physician    Diagnosis: Hypothyroidism, unspecified type  Assessment and Plan of Treatment: Hypothyroidism, unspecified type    Diagnosis: Brain aneurysm  Assessment and Plan of Treatment:   CTA of head and neck  without large stenosis or occlusion, +incidentally found R MCA bifurcation aneurysm found.   Outpatient serial imaging recommended PRINCIPAL DISCHARGE DIAGNOSIS  Diagnosis: Dizziness  Assessment and Plan of Treatment: Orthostatic negative. Labs unrevealing, no infectious signs  CXR clear, CTH showed no acute changes. 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  s/p neurology evaluation. low suspicion for acute cva or central cause of vertigo, possible peripheral vertigo, good response to meclizine,   Continue with meclizine as needed  Echocardiogram can be done as outpatient   May have  vestibular therapy for continued episodes of peripheral vertigo  You can follow up with Neurology outpatient: [address] [place] [address] [phone]  Please follow up with your primary care physician in one week        SECONDARY DISCHARGE DIAGNOSES  Diagnosis: Hypertension  Assessment and Plan of Treatment: Noted Elevated BP  Please continue Norvasc as directed  Low salt diet recommended  Please follow up with your primary care physician    Diagnosis: Hypothyroidism, unspecified type  Assessment and Plan of Treatment: Hypothyroidism, unspecified type    Diagnosis: Brain aneurysm  Assessment and Plan of Treatment:   CTA of head and neck  without large stenosis or occlusion, +incidentally found R MCA bifurcation aneurysm found.   Outpatient serial imaging recommended

## 2020-09-05 NOTE — DISCHARGE NOTE NURSING/CASE MANAGEMENT/SOCIAL WORK - INFLUENZA IMMUNIZATION DATE (APPROXIMATE):
Calls for to reschedule missed appointment. ( consult appointment)  Patient called stating that he is inpatient today - 1-8-18 - unknown release date. Patient will call back to re-schedule  When he is released and can arrange transportation.  
I will let Dr. Andrade know that patient cancelled due to being inpatient.   
05-Sep-2020

## 2020-09-05 NOTE — PHYSICAL THERAPY INITIAL EVALUATION ADULT - PERTINENT HX OF CURRENT PROBLEM, REHAB EVAL
70 F PMH hypothyroidism, p/w dizziness and vomiting.  Onset 1 day ago. In ED, VS: 98.3, 67, 169/95, 17, 98% RA.  Orthostatic negative in ER. Labs: cbc unrevealing. UA neg.  CXR prelim clear lungs. CTH no acute findings. CTA h/n: no occclusions or stenosis, +incidental R MCA bifurcation 3 mm aneurysm. IN ER pt received ivf, tylenol, zofran, and meclizine; pt endorses feeling better after meclizine. Neuro c/s ordered. ED staff performed kyra-hallpike prior to evaluation.

## 2020-09-05 NOTE — PHYSICAL THERAPY INITIAL EVALUATION ADULT - ADDITIONAL COMMENTS
Per Neuro c/s: Dizziness possibly peripheral vertigo improved with kyra hallpike maneuver vs 2/2 dehydration. Low suspicion for central pathology or stroke. Labs unremarkable Per Neuro c/s: Dizziness possibly peripheral vertigo improved with kyra hallpike maneuver vs 2/2 dehydration. Low suspicion for central pathology or stroke. Labs unremarkable.  Pt lives with spouse, adult children and young grandchildren  in a private house with no steps to enter and 1 flight of stairs to bedroom, + handrail.

## 2020-09-05 NOTE — PROGRESS NOTE ADULT - ATTENDING COMMENTS
plan of care dw patient and dtr over the phone plan of care dw patient and dtr over the phone  dc planning

## 2020-09-05 NOTE — CONSULT NOTE ADULT - SUBJECTIVE AND OBJECTIVE BOX
CHIEF COMPLAINT:    HPI:  70 F PMH hypothyroidism, p/w dizziness and vomiting.  Onset 1 day ago, was feeling fine the night before, states she ate well. She showered this am and daughter brought her grandkids over for her to watch, pt soon after developed room spinning dizziness. Son gave her gatorade, after which pt endorsed feeling better. Pt also tried to eat something salty, after which she vomited, nbnb.  Pt also does endorse that if she gets up from lying down quickly she feels dizzy.  She denies cp, sob, santa, f/c, diarrhea, abd pain, back pain, visual changes, HA, neck pain or stiffness, incontinence, tearing abd or back pain. Denies new meds.     VS: 98.3, 67, 169/95, 17, 98% RA.  Orthostatic negative in ER. Labs: cbc unrevealing, HST delta 10 -->7, TSH wnl, cmp unrevealing, mg/phos wnl, UA neg.  CXR prelim clear lungs. CTH no acute findings. CTA h/n: no occclusions or stenosis, +incidental R MCA bifurcation 3 mm aneurysm. IN ER pt received ivf, tylenol, zofran, and meclizine; pt endorses feeling better after meclizine.       PAST MEDICAL & SURGICAL HISTORY:  Hypothyroid  No significant past surgical history          PREVIOUS DIAGNOSTIC TESTING:    [ ] Echocardiogram:  [ ]  Catheterization:  [ ] Stress Test:  	    MEDICATIONS:  MEDICATIONS  (STANDING):  amLODIPine   Tablet 5 milliGRAM(s) Oral daily  enoxaparin Injectable 40 milliGRAM(s) SubCutaneous daily  influenza   Vaccine 0.5 milliLiter(s) IntraMuscular once  levothyroxine 125 MICROGram(s) Oral daily      FAMILY HISTORY:  No pertinent family history in first degree relatives      SOCIAL HISTORY:    [ ] Non-smoker  [ ] Smoker  [ ] Alcohol    Allergies    No Known Allergies    Intolerances    	    REVIEW OF SYSTEMS:  CONSTITUTIONAL: No fever, weight loss, or fatigue  EYES: No eye pain, visual disturbances, or discharge  ENMT:  No difficulty hearing, tinnitus, vertigo; No sinus or throat pain  NECK: No pain or stiffness  RESPIRATORY: No cough, wheezing, chills or hemoptysis; No Shortness of Breath  CARDIOVASCULAR: No chest pain, palpitations, passing out, dizziness, or leg swelling  GASTROINTESTINAL: No abdominal or epigastric pain. No nausea, vomiting, or hematemesis; No diarrhea or constipation. No melena or hematochezia.  GENITOURINARY: No dysuria, frequency, hematuria, or incontinence  NEUROLOGICAL: No headaches, memory loss, loss of strength, numbness, or tremors  SKIN: No itching, burning, rashes, or lesions   	    [ ] All others negative	  [ ] Unable to obtain    PHYSICAL EXAM:  T(C): 36.8 (09-05-20 @ 08:58), Max: 37.1 (09-04-20 @ 20:05)  HR: 66 (09-05-20 @ 08:58) (53 - 72)  BP: 170/83 (09-05-20 @ 08:58) (162/72 - 170/83)  RR: 18 (09-05-20 @ 08:58) (16 - 18)  SpO2: 100% (09-05-20 @ 08:58) (96% - 100%)  Wt(kg): --  I&O's Summary    04 Sep 2020 07:01  -  05 Sep 2020 07:00  --------------------------------------------------------  IN: 240 mL / OUT: 2 mL / NET: 238 mL    05 Sep 2020 07:01  -  05 Sep 2020 10:24  --------------------------------------------------------  IN: 240 mL / OUT: 0 mL / NET: 240 mL        Appearance: Normal	  Psychiatry: A & O x 3, Mood & affect appropriate  HEENT:   Normal oral mucosa, PERRL, EOMI	  Lymphatic: No lymphadenopathy  Cardiovascular: Normal S1 S2,RRR, No JVD, No murmurs  Respiratory: Lungs clear to auscultation	  Gastrointestinal:  Soft, Non-tender, + BS	  Skin: No rashes, No ecchymoses, No cyanosis	  Neurologic: Non-focal  Extremities: Normal range of motion, No clubbing, cyanosis or edema  Vascular: Peripheral pulses palpable 2+ bilaterally    TELEMETRY: 	    ECG:  	  RADIOLOGY:  OTHER: 	  	  LABS:	 	    CARDIAC MARKERS:                                  12.8   6.67  )-----------( 332      ( 05 Sep 2020 06:25 )             40.6     09-05    141  |  106  |  12  ----------------------------<  98  3.4<L>   |  25  |  0.74    Ca    9.2      05 Sep 2020 06:25  Phos  3.2     09-05  Mg     2.2     09-05    TPro  7.0  /  Alb  4.2  /  TBili  0.4  /  DBili  x   /  AST  21  /  ALT  18  /  AlkPhos  82  09-05      proBNP:   Lipid Profile:   HgA1c:   TSH: Thyroid Stimulating Hormone, Serum: 0.84 uIU/mL (09-04 @ 18:16)      ASSESSMENT/PLAN:

## 2020-09-05 NOTE — DISCHARGE NOTE PROVIDER - NSDCMRMEDTOKEN_GEN_ALL_CORE_FT
levothyroxine 125 mcg (0.125 mg) oral tablet: 1 tab(s) orally once a day amLODIPine 5 mg oral tablet: 1 tab(s) orally once a day  levothyroxine 125 mcg (0.125 mg) oral tablet: 1 tab(s) orally once a day  meclizine 25 mg oral tablet: 1 tab(s) orally every 8 hours, As needed, Dizziness

## 2020-09-05 NOTE — DISCHARGE NOTE PROVIDER - CARE PROVIDER_API CALL
Shaye Anthony  INTERNAL MEDICINE  [address] [place] [address]  Phone: [phone]  Fax: [phone]  Follow Up Time: 1 week

## 2020-09-05 NOTE — DISCHARGE NOTE PROVIDER - HOSPITAL COURSE
70 F PMH hypothyroidism, p/w dizziness and vomiting.     VSS here, orthostatic negative. Labs unrevealing, no infectious sx: afeb, no leukocytosis, CXR clear, CTH reviewed, no acute changes. 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 d/w patient, to get serial imaging as outpatient    s/p neurology evaluation. low suspicion for acute cva or central cause of vertigo, possible peripheral vertigo, good response to meclizine, c/w prn meclizine for now. TSH WNL    ST delta neg. EKG nonischemic.  consider nonemergent cards eval in am.

## 2020-09-05 NOTE — CONSULT NOTE ADULT - ASSESSMENT
70 F PMH hypothyroidism, p/w dizziness and vomiting    -CV stable  -No tele events  -orthostatics negative in the ED  -ECG reveals low voltage/possible ant infarct  -Check ECHO  -CTH reveals incidental small incidental R MCA bifurcation 3 mm aneurysm  -workup per neurology

## 2020-09-05 NOTE — PROGRESS NOTE ADULT - SUBJECTIVE AND OBJECTIVE BOX
Patient is a 70y old  Female who presents with a chief complaint of dizziness, n/v (05 Sep 2020 10:23)      SUBJECTIVE / OVERNIGHT EVENTS:    Patient seen and examined. denies cp sob. feels dizziness improved. ambulated without problem to the bathroom.       Vital Signs Last 24 Hrs  T(C): 36.8 (05 Sep 2020 08:58), Max: 37.1 (04 Sep 2020 20:05)  T(F): 98.3 (05 Sep 2020 08:58), Max: 98.7 (04 Sep 2020 20:05)  HR: 70 (05 Sep 2020 11:35) (53 - 72)  BP: 150/83 (05 Sep 2020 11:35) (150/83 - 170/83)  BP(mean): --  RR: 18 (05 Sep 2020 08:58) (16 - 18)  SpO2: 98% (05 Sep 2020 11:35) (96% - 100%)  I&O's Summary    04 Sep 2020 07:01  -  05 Sep 2020 07:00  --------------------------------------------------------  IN: 240 mL / OUT: 2 mL / NET: 238 mL    05 Sep 2020 07:01  -  05 Sep 2020 11:42  --------------------------------------------------------  IN: 240 mL / OUT: 0 mL / NET: 240 mL        PE:  GENERAL: NAD, AAOx3  HEAD:  Atraumatic, Normocephalic  CHEST/LUNG: CTABL, No wheeze  HEART: Regular rate and rhythm; no murmur  ABDOMEN: Soft, Nontender, Nondistended; Bowel sounds present  EXTREMITIES:  2+ Peripheral Pulses, No clubbing, cyanosis, or edema  SKIN: No rashes or lesions  NEURO: No focal deficits    LABS:                        12.8   6.67  )-----------( 332      ( 05 Sep 2020 06:25 )             40.6     09-05    141  |  106  |  12  ----------------------------<  98  3.4<L>   |  25  |  0.74    Ca    9.2      05 Sep 2020 06:25  Phos  3.2     -  Mg     2.2     -    TPro  7.0  /  Alb  4.2  /  TBili  0.4  /  DBili  x   /  AST  21  /  ALT  18  /  AlkPhos  82  09-05      CAPILLARY BLOOD GLUCOSE            Urinalysis Basic - ( 04 Sep 2020 13:07 )    Color: Colorless / Appearance: Clear / S.007 / pH: x  Gluc: x / Ketone: Negative  / Bili: Negative / Urobili: Negative   Blood: x / Protein: Negative / Nitrite: Negative   Leuk Esterase: Negative / RBC: x / WBC x   Sq Epi: x / Non Sq Epi: x / Bacteria: x        RADIOLOGY & ADDITIONAL TESTS:    Imaging Personally Reviewed:  [x] YES  [ ] NO    Consultant(s) Notes Reviewed:  [x] YES  [ ] NO    MEDICATIONS  (STANDING):  amLODIPine   Tablet 5 milliGRAM(s) Oral daily  enoxaparin Injectable 40 milliGRAM(s) SubCutaneous daily  influenza   Vaccine 0.5 milliLiter(s) IntraMuscular once  levothyroxine 125 MICROGram(s) Oral daily    MEDICATIONS  (PRN):  meclizine 25 milliGRAM(s) Oral every 8 hours PRN Dizziness  ondansetron Injectable 4 milliGRAM(s) IV Push every 12 hours PRN Nausea      Care Discussed with Consultants/Other Providers [x] YES  [ ] NO    HEALTH ISSUES - PROBLEM Dx:  Prophylactic measure: Prophylactic measure  Hypothyroidism, unspecified type: Hypothyroidism, unspecified type  Non-intractable vomiting with nausea: Non-intractable vomiting with nausea  Dizziness: Dizziness

## 2020-09-05 NOTE — PROGRESS NOTE ADULT - PROBLEM SELECTOR PLAN 1
orthostatic negative  CTH reviewed, no acute changes. 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 d/w patient and dtr, to get serial imaging as outpatient  -Neuro recs appreciated; no low suspicion for acute cva or central cause of vertigo, possible peripheral vertigo  -good response to meclizine, c/w prn meclizine for now  -fall prec  -TSH wnl  -check TTE  -cardio consult for low voltage ekg  - hypertension, start norvasc 5mg qd  - PT eval orthostatic negative  CTH reviewed, no acute changes. 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 d/w patient and dtr, to get serial imaging as outpatient  Neuro recs appreciated; no low suspicion for acute cva or central cause of vertigo, possible peripheral vertigo  good response to meclizine, c/w prn meclizine for now  fall prec  TSH wnl  -cardio consult for low voltage ekg, prior ekg obtained from  EMR 6/16/2020 and similar low voltage. dw dr. Carballo and patient can fu outpt for TTE.  hypertension, start norvasc 5mg qd  PT eval  outpt fu PMD Dr. Anthony

## 2021-03-16 PROBLEM — E03.9 HYPOTHYROIDISM, UNSPECIFIED: Chronic | Status: ACTIVE | Noted: 2020-09-04

## 2021-04-12 ENCOUNTER — APPOINTMENT (OUTPATIENT)
Dept: DISASTER EMERGENCY | Facility: OTHER | Age: 71
End: 2021-04-12
Payer: MEDICARE

## 2021-04-12 PROCEDURE — 0012A: CPT

## 2021-07-19 NOTE — DISCHARGE NOTE NURSING/CASE MANAGEMENT/SOCIAL WORK - PATIENT PORTAL LINK FT
You can access the FollowMyHealth Patient Portal offered by [facility] by registering at the following website: [email or web address]. By joining [facility]’s FollowMyHealth portal, you will also be able to view your health information using other applications (apps) compatible with our system. no

## 2023-01-09 NOTE — PATIENT PROFILE ADULT - NSPROEDAREADYLEARN_GEN_A_NUR
Group Topic: BH Activity Group    Date: 1/9/2023  Start Time: 12:30 PM  End Time:  1:30 PM  Facilitators: JAROD Gallo    Focus: Safety  Number in attendance: 5  Written work, discussion and experiential learning activity.    Method: Group   Attendance: Present  Participation: Active  Patient Response: Attentive and Quiet  Mood: Depressed  Affect: Type: Depressed   Range: Blunted/flat    Stability: Stable  Behavior/Socialization: Cooperative and Guarded  Thought Process: Focused and Goal-directed  Task Performance: Follows directions  Patient Evaluation: Encouragement - needs prompts   Acclimating to the group.  Cooperative and polite.  Quiet.  She acknowledged understanding the group expectations/rules.       none
